# Patient Record
Sex: FEMALE | Race: AMERICAN INDIAN OR ALASKA NATIVE
[De-identification: names, ages, dates, MRNs, and addresses within clinical notes are randomized per-mention and may not be internally consistent; named-entity substitution may affect disease eponyms.]

---

## 2017-11-22 ENCOUNTER — HOSPITAL ENCOUNTER (EMERGENCY)
Dept: HOSPITAL 5 - ED | Age: 52
LOS: 2 days | Discharge: PSYCHIATRIC OR SUBSTANCE USE FACILITY | End: 2017-11-24
Payer: SELF-PAY
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
## 2017-11-22 DIAGNOSIS — F12.10: ICD-10-CM

## 2017-11-22 DIAGNOSIS — Z91.040: ICD-10-CM

## 2017-11-22 DIAGNOSIS — T42.4X2A: Primary | ICD-10-CM

## 2017-11-22 DIAGNOSIS — F17.200: ICD-10-CM

## 2017-11-22 DIAGNOSIS — F32.9: ICD-10-CM

## 2017-11-22 LAB
ALBUMIN SERPL-MCNC: 3.8 G/DL (ref 3.9–5)
ALBUMIN/GLOB SERPL: 1.2 %
ALP SERPL-CCNC: 115 UNITS/L (ref 35–129)
ALT SERPL-CCNC: 11 UNITS/L (ref 7–56)
ANION GAP SERPL CALC-SCNC: 19 MMOL/L
BACTERIA #/AREA URNS HPF: (no result) /HPF
BASOPHILS NFR BLD AUTO: 0.4 % (ref 0–1.8)
BILIRUB DIRECT SERPL-MCNC: < 0.2 MG/DL (ref 0–0.2)
BILIRUB SERPL-MCNC: 0.4 MG/DL (ref 0.1–1.2)
BILIRUB UR QL STRIP: (no result)
BLOOD UR QL VISUAL: (no result)
BUN SERPL-MCNC: 20 MG/DL (ref 7–17)
BUN/CREAT SERPL: 25 %
CALCIUM SERPL-MCNC: 9.1 MG/DL (ref 8.4–10.2)
CHLORIDE SERPL-SCNC: 101.4 MMOL/L (ref 98–107)
CO2 SERPL-SCNC: 28 MMOL/L (ref 22–30)
EOSINOPHIL NFR BLD AUTO: 2.1 % (ref 0–4.3)
GLUCOSE SERPL-MCNC: 131 MG/DL (ref 65–100)
HCT VFR BLD CALC: 41.5 % (ref 30.3–42.9)
HGB BLD-MCNC: 14 GM/DL (ref 10.1–14.3)
KETONES UR STRIP-MCNC: (no result) MG/DL
LEUKOCYTE ESTERASE UR QL STRIP: (no result)
MAGNESIUM SERPL-MCNC: 2 MG/DL (ref 1.7–2.3)
MCH RBC QN AUTO: 29 PG (ref 28–32)
MCHC RBC AUTO-ENTMCNC: 34 % (ref 30–34)
MCV RBC AUTO: 86 FL (ref 79–97)
MUCOUS THREADS #/AREA URNS HPF: (no result) /HPF
NITRITE UR QL STRIP: (no result)
PH UR STRIP: 5 [PH] (ref 5–7)
PLATELET # BLD: 262 K/MM3 (ref 140–440)
POTASSIUM SERPL-SCNC: 3.3 MMOL/L (ref 3.6–5)
PROT SERPL-MCNC: 7 G/DL (ref 6.3–8.2)
PROT UR STRIP-MCNC: (no result) MG/DL
RBC # BLD AUTO: 4.81 M/MM3 (ref 3.65–5.03)
RBC #/AREA URNS HPF: 1 /HPF (ref 0–6)
SODIUM SERPL-SCNC: 145 MMOL/L (ref 137–145)
URINE DRUGS OF ABUSE NOTE: (no result)
UROBILINOGEN UR-MCNC: < 2 MG/DL (ref ?–2)
WBC # BLD AUTO: 11 K/MM3 (ref 4.5–11)
WBC #/AREA URNS HPF: 1 /HPF (ref 0–6)

## 2017-11-22 PROCEDURE — 80320 DRUG SCREEN QUANTALCOHOLS: CPT

## 2017-11-22 PROCEDURE — 80307 DRUG TEST PRSMV CHEM ANLYZR: CPT

## 2017-11-22 PROCEDURE — 80074 ACUTE HEPATITIS PANEL: CPT

## 2017-11-22 PROCEDURE — 36415 COLL VENOUS BLD VENIPUNCTURE: CPT

## 2017-11-22 PROCEDURE — 99284 EMERGENCY DEPT VISIT MOD MDM: CPT

## 2017-11-22 PROCEDURE — G0480 DRUG TEST DEF 1-7 CLASSES: HCPCS

## 2017-11-22 PROCEDURE — 93005 ELECTROCARDIOGRAM TRACING: CPT

## 2017-11-22 PROCEDURE — 80048 BASIC METABOLIC PNL TOTAL CA: CPT

## 2017-11-22 PROCEDURE — 85025 COMPLETE CBC W/AUTO DIFF WBC: CPT

## 2017-11-22 PROCEDURE — 81001 URINALYSIS AUTO W/SCOPE: CPT

## 2017-11-22 PROCEDURE — 83735 ASSAY OF MAGNESIUM: CPT

## 2017-11-22 PROCEDURE — 93010 ELECTROCARDIOGRAM REPORT: CPT
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
## 2017-11-23 RX ADMIN — OXYBUTYNIN CHLORIDE SCH MG: 5 TABLET ORAL at 16:29

## 2017-11-23 RX ADMIN — AMLODIPINE BESYLATE SCH MG: 10 TABLET ORAL at 14:30

## 2017-11-23 RX ADMIN — SERTRALINE SCH MG: 50 TABLET, FILM COATED ORAL at 14:18

## 2017-11-23 NOTE — EVENT NOTE
Date: 11/23/17





Patient complained of mild throbbing headache.  The headache started last 

night.  Not sudden or thunderclap in nature.  It does not reach maximal 

intensity within an hour.  It is not the worse headache of her life.  She also 

reports her blood pressure medications which she has supplied to the nurse.  

When I walk into the room, the patient is speaking on the phone, in no distress

, and has an unremarkable neurologic examination.  There is specifically no 

neck pain or neck stiffness, and she walks with a steady gait with no obvious 

cranial nerve deficits, no motor deficits.


 Vital Signs











  11/22/17 11/22/17 11/22/17





  01:34 01:36 01:46


 


Temperature 97.4 F L  


 


Pulse Rate 95 H  90


 


Respiratory 16  25 H





Rate   


 


Blood Pressure 137/85 137/85 137/85


 


Blood Pressure   





[Right]   


 


O2 Sat by Pulse 98  98





Oximetry   














  11/22/17 11/22/17 11/22/17





  02:00 02:14 02:16


 


Temperature  98.2 F 


 


Pulse Rate 87  85


 


Respiratory 23 16 28 H





Rate   


 


Blood Pressure   136/81


 


Blood Pressure   





[Right]   


 


O2 Sat by Pulse 99 98 90





Oximetry   














  11/22/17 11/22/17 11/22/17





  02:30 02:46 03:16


 


Temperature   


 


Pulse Rate 86 87 91 H


 


Respiratory 29 H 30 H 28 H





Rate   


 


Blood Pressure 140/83 146/119 112/63


 


Blood Pressure   





[Right]   


 


O2 Sat by Pulse 97 96 95





Oximetry   














  11/22/17 11/22/17 11/22/17





  03:30 03:46 04:00


 


Temperature   


 


Pulse Rate 90 91 H 91 H


 


Respiratory 26 H 26 H 27 H





Rate   


 


Blood Pressure 112/63 115/67 115/67


 


Blood Pressure   





[Right]   


 


O2 Sat by Pulse 96 95 96





Oximetry   














  11/22/17 11/22/17 11/22/17





  04:16 04:30 04:46


 


Temperature   


 


Pulse Rate 92 H 89 85


 


Respiratory 28 H 25 H 26 H





Rate   


 


Blood Pressure 121/59 121/59 122/74


 


Blood Pressure   





[Right]   


 


O2 Sat by Pulse 94 95 97





Oximetry   














  11/22/17 11/22/17 11/22/17





  05:00 05:16 05:30


 


Temperature   


 


Pulse Rate 84 84 84


 


Respiratory 22 25 H 20





Rate   


 


Blood Pressure 122/74 121/66 


 


Blood Pressure   





[Right]   


 


O2 Sat by Pulse 95 95 95





Oximetry   














  11/22/17 11/22/17 11/22/17





  05:46 06:00 06:16


 


Temperature   


 


Pulse Rate 82 82 87


 


Respiratory 30 H 27 H 15





Rate   


 


Blood Pressure 121/66 121/66 


 


Blood Pressure   





[Right]   


 


O2 Sat by Pulse 97 94 





Oximetry   














  11/22/17 11/22/17 11/23/17





  14:00 16:43 02:00


 


Temperature 97.8 F  98.8 F


 


Pulse Rate 18 L  98 H


 


Respiratory 18 18 18





Rate   


 


Blood Pressure   


 


Blood Pressure 149/103  141/88





[Right]   


 


O2 Sat by Pulse  98 





Oximetry   














  11/23/17 11/23/17





  09:10 09:11


 


Temperature 98.2 F 


 


Pulse Rate 88 


 


Respiratory 20 20





Rate  


 


Blood Pressure  


 


Blood Pressure 171/100 





[Right]  


 


O2 Sat by Pulse 98 98





Oximetry  








 Lab Results











  11/22/17 11/22/17 11/22/17 Range/Units





  01:47 01:47 01:47 


 


WBC    11.0  (4.5-11.0)  K/mm3


 


RBC    4.81  (3.65-5.03)  M/mm3


 


Hgb    14.0  (10.1-14.3)  gm/dl


 


Hct    41.5  (30.3-42.9)  %


 


MCV    86  (79-97)  fl


 


MCH    29  (28-32)  pg


 


MCHC    34  (30-34)  %


 


RDW    15.2  (13.2-15.2)  %


 


Plt Count    262  (140-440)  K/mm3


 


Lymph % (Auto)    29.1  (13.4-35.0)  %


 


Mono % (Auto)    7.9 H  (0.0-7.3)  %


 


Eos % (Auto)    2.1  (0.0-4.3)  %


 


Baso % (Auto)    0.4  (0.0-1.8)  %


 


Lymph #    3.2  (1.2-5.4)  K/mm3


 


Mono #    0.9 H  (0.0-0.8)  K/mm3


 


Eos #    0.2  (0.0-0.4)  K/mm3


 


Baso #    0.0  (0.0-0.1)  K/mm3


 


Seg Neutrophils %    60.5  (40.0-70.0)  %


 


Seg Neutrophils #    6.7  (1.8-7.7)  K/mm3


 


Sodium  145    (137-145)  mmol/L


 


Potassium  3.3 L    (3.6-5.0)  mmol/L


 


Chloride  101.4    ()  mmol/L


 


Carbon Dioxide  28    (22-30)  mmol/L


 


Anion Gap  19    mmol/L


 


BUN  20 H    (7-17)  mg/dL


 


Creatinine  0.8    (0.7-1.2)  mg/dL


 


Estimated GFR  > 60    ml/min


 


BUN/Creatinine Ratio  25    %


 


Glucose  131 H    ()  mg/dL


 


Calcium  9.1    (8.4-10.2)  mg/dL


 


Magnesium     (1.7-2.3)  mg/dL


 


Total Bilirubin     (0.1-1.2)  mg/dL


 


Direct Bilirubin     (0-0.2)  mg/dL


 


AST     (5-40)  units/L


 


ALT     (7-56)  units/L


 


Alkaline Phosphatase     ()  units/L


 


Total Protein     (6.3-8.2)  g/dL


 


Albumin     (3.9-5)  g/dL


 


Albumin/Globulin Ratio     %


 


Urine Color     (Yellow)  


 


Urine Turbidity     (Clear)  


 


Urine pH     (5.0-7.0)  


 


Ur Specific Gravity     (1.003-1.030)  


 


Urine Protein     (Negative)  mg/dL


 


Urine Glucose (UA)     (Negative)  mg/dL


 


Urine Ketones     (Negative)  mg/dL


 


Urine Blood     (Negative)  


 


Urine Nitrite     (Negative)  


 


Ur Reducing Substances     


 


Urine Bilirubin     (Negative)  


 


Urine Ictotest     


 


Urine Urobilinogen     (<2.0)  mg/dL


 


Ur Leukocyte Esterase     (Negative)  


 


Urine WBC (Auto)     (0.0-6.0)  /HPF


 


Urine RBC (Auto)     (0.0-6.0)  /HPF


 


U Epithel Cells (Auto)     (0-13.0)  /HPF


 


Urine Bacteria (Auto)     (Negative)  /HPF


 


Hyaline Casts     /LPF


 


Urine Mucus     /HPF


 


Salicylates     (2.8-20.0)  mg/dL


 


Urine Opiates Screen     


 


Urine Methadone Screen     


 


Acetaminophen     (10.0-30.0)  ug/mL


 


Ur Barbiturates Screen     


 


Ur Phencyclidine Scrn     


 


Ur Amphetamines Screen     


 


U Benzodiazepines Scrn     


 


Urine Cocaine Screen     


 


U Marijuana (THC) Screen     


 


Drugs of Abuse Note     


 


Plasma/Serum Alcohol   < 0.01   (0-0.07)  gm%














  11/22/17 11/22/17 11/22/17 Range/Units





  01:47 01:47 01:47 


 


WBC     (4.5-11.0)  K/mm3


 


RBC     (3.65-5.03)  M/mm3


 


Hgb     (10.1-14.3)  gm/dl


 


Hct     (30.3-42.9)  %


 


MCV     (79-97)  fl


 


MCH     (28-32)  pg


 


MCHC     (30-34)  %


 


RDW     (13.2-15.2)  %


 


Plt Count     (140-440)  K/mm3


 


Lymph % (Auto)     (13.4-35.0)  %


 


Mono % (Auto)     (0.0-7.3)  %


 


Eos % (Auto)     (0.0-4.3)  %


 


Baso % (Auto)     (0.0-1.8)  %


 


Lymph #     (1.2-5.4)  K/mm3


 


Mono #     (0.0-0.8)  K/mm3


 


Eos #     (0.0-0.4)  K/mm3


 


Baso #     (0.0-0.1)  K/mm3


 


Seg Neutrophils %     (40.0-70.0)  %


 


Seg Neutrophils #     (1.8-7.7)  K/mm3


 


Sodium     (137-145)  mmol/L


 


Potassium     (3.6-5.0)  mmol/L


 


Chloride     ()  mmol/L


 


Carbon Dioxide     (22-30)  mmol/L


 


Anion Gap     mmol/L


 


BUN     (7-17)  mg/dL


 


Creatinine     (0.7-1.2)  mg/dL


 


Estimated GFR     ml/min


 


BUN/Creatinine Ratio     %


 


Glucose     ()  mg/dL


 


Calcium     (8.4-10.2)  mg/dL


 


Magnesium  2.00    (1.7-2.3)  mg/dL


 


Total Bilirubin  0.40    (0.1-1.2)  mg/dL


 


Direct Bilirubin  < 0.2    (0-0.2)  mg/dL


 


AST  11    (5-40)  units/L


 


ALT  11    (7-56)  units/L


 


Alkaline Phosphatase  115    ()  units/L


 


Total Protein  7.0    (6.3-8.2)  g/dL


 


Albumin  3.8 L    (3.9-5)  g/dL


 


Albumin/Globulin Ratio  1.2    %


 


Urine Color     (Yellow)  


 


Urine Turbidity     (Clear)  


 


Urine pH     (5.0-7.0)  


 


Ur Specific Gravity     (1.003-1.030)  


 


Urine Protein     (Negative)  mg/dL


 


Urine Glucose (UA)     (Negative)  mg/dL


 


Urine Ketones     (Negative)  mg/dL


 


Urine Blood     (Negative)  


 


Urine Nitrite     (Negative)  


 


Ur Reducing Substances     


 


Urine Bilirubin     (Negative)  


 


Urine Ictotest     


 


Urine Urobilinogen     (<2.0)  mg/dL


 


Ur Leukocyte Esterase     (Negative)  


 


Urine WBC (Auto)     (0.0-6.0)  /HPF


 


Urine RBC (Auto)     (0.0-6.0)  /HPF


 


U Epithel Cells (Auto)     (0-13.0)  /HPF


 


Urine Bacteria (Auto)     (Negative)  /HPF


 


Hyaline Casts     /LPF


 


Urine Mucus     /HPF


 


Salicylates   < 0.3 L   (2.8-20.0)  mg/dL


 


Urine Opiates Screen     


 


Urine Methadone Screen     


 


Acetaminophen    < 15.0  (10.0-30.0)  ug/mL


 


Ur Barbiturates Screen     


 


Ur Phencyclidine Scrn     


 


Ur Amphetamines Screen     


 


U Benzodiazepines Scrn     


 


Urine Cocaine Screen     


 


U Marijuana (THC) Screen     


 


Drugs of Abuse Note     


 


Plasma/Serum Alcohol     (0-0.07)  gm%














  11/22/17 11/22/17 Range/Units





  07:50 07:50 


 


WBC    (4.5-11.0)  K/mm3


 


RBC    (3.65-5.03)  M/mm3


 


Hgb    (10.1-14.3)  gm/dl


 


Hct    (30.3-42.9)  %


 


MCV    (79-97)  fl


 


MCH    (28-32)  pg


 


MCHC    (30-34)  %


 


RDW    (13.2-15.2)  %


 


Plt Count    (140-440)  K/mm3


 


Lymph % (Auto)    (13.4-35.0)  %


 


Mono % (Auto)    (0.0-7.3)  %


 


Eos % (Auto)    (0.0-4.3)  %


 


Baso % (Auto)    (0.0-1.8)  %


 


Lymph #    (1.2-5.4)  K/mm3


 


Mono #    (0.0-0.8)  K/mm3


 


Eos #    (0.0-0.4)  K/mm3


 


Baso #    (0.0-0.1)  K/mm3


 


Seg Neutrophils %    (40.0-70.0)  %


 


Seg Neutrophils #    (1.8-7.7)  K/mm3


 


Sodium    (137-145)  mmol/L


 


Potassium    (3.6-5.0)  mmol/L


 


Chloride    ()  mmol/L


 


Carbon Dioxide    (22-30)  mmol/L


 


Anion Gap    mmol/L


 


BUN    (7-17)  mg/dL


 


Creatinine    (0.7-1.2)  mg/dL


 


Estimated GFR    ml/min


 


BUN/Creatinine Ratio    %


 


Glucose    ()  mg/dL


 


Calcium    (8.4-10.2)  mg/dL


 


Magnesium    (1.7-2.3)  mg/dL


 


Total Bilirubin    (0.1-1.2)  mg/dL


 


Direct Bilirubin    (0-0.2)  mg/dL


 


AST    (5-40)  units/L


 


ALT    (7-56)  units/L


 


Alkaline Phosphatase    ()  units/L


 


Total Protein    (6.3-8.2)  g/dL


 


Albumin    (3.9-5)  g/dL


 


Albumin/Globulin Ratio    %


 


Urine Color  Yellow   (Yellow)  


 


Urine Turbidity  Clear   (Clear)  


 


Urine pH  5.0   (5.0-7.0)  


 


Ur Specific Gravity  1.016   (1.003-1.030)  


 


Urine Protein  <15 mg/dl   (Negative)  mg/dL


 


Urine Glucose (UA)  Neg   (Negative)  mg/dL


 


Urine Ketones  Neg   (Negative)  mg/dL


 


Urine Blood  Neg   (Negative)  


 


Urine Nitrite  Neg   (Negative)  


 


Ur Reducing Substances  Not Reportable   


 


Urine Bilirubin  Neg   (Negative)  


 


Urine Ictotest  Not Reportable   


 


Urine Urobilinogen  < 2.0   (<2.0)  mg/dL


 


Ur Leukocyte Esterase  Neg   (Negative)  


 


Urine WBC (Auto)  1.0   (0.0-6.0)  /HPF


 


Urine RBC (Auto)  1.0   (0.0-6.0)  /HPF


 


U Epithel Cells (Auto)  1.0   (0-13.0)  /HPF


 


Urine Bacteria (Auto)  1+   (Negative)  /HPF


 


Hyaline Casts  4   /LPF


 


Urine Mucus  Few   /HPF


 


Salicylates    (2.8-20.0)  mg/dL


 


Urine Opiates Screen   Presumptive negative  


 


Urine Methadone Screen   Presumptive negative  


 


Acetaminophen    (10.0-30.0)  ug/mL


 


Ur Barbiturates Screen   Presumptive negative  


 


Ur Phencyclidine Scrn   Presumptive negative  


 


Ur Amphetamines Screen   Presumptive negative  


 


U Benzodiazepines Scrn   Presumptive positive  


 


Urine Cocaine Screen   Presumptive negative  


 


U Marijuana (THC) Screen   Presumptive positive  


 


Drugs of Abuse Note   Disclamer  


 


Plasma/Serum Alcohol    (0-0.07)  gm%

## 2017-11-23 NOTE — CONSULTATION
History of Present Illness





- Reason for Consult


Consult date: 11/23/17


Reason for consult: Mental Health Evaluation


Requesting physician: REMINGTON SINGH





- Chief Complaint


Chief complaint: 


"I don't remember"








- History of Present Psychiatric Illness


52 y.o. AA female presenting to [facility] for possible overdose. Per the ER note, 

patient was pulled over by the local police for driving intoxicated. Today 

patient is calm and cooperative during the assessment. She stated that she took 

her medications yesterday morning along with taking a muscle relaxant midday. 

She stated that she takes evening medications also. She stated that she took a 

xanax and drove to the store and don't remember anything else. She stated that 

she woke up in the hospital. She stated that she takes a lot of medications and 

would like to decrease some of her pills. She stated having a lot of medical 

issues that has interfered with her being able to work. She stated that she 

feel sad and hopeless because she depends on her kids and  for "money 

and other assistance." She stated that she was dx with depression and anxiety a 

couple years ago. She rate her depression 6/10, with 10 being the worse. She 

denies trying to harm herself prior to her admission. She denies SI/HI's and AVH

's. She denies manic episodes. She admitted to smoking marijuana, but denies 

alcohol consumption (etoh). She stated that she take Zoloft for depression.








Medications and Allergies


 Allergies











Allergy/AdvReac Type Severity Reaction Status Date / Time


 


latex Allergy  Rash Verified 11/23/17 12:52











 Home Medications











 Medication  Instructions  Recorded  Confirmed  Last Taken  Type


 


Cyclobenzaprine [Flexeril] 10 mg PO DAILY 11/23/17 11/23/17 Unknown History


 


Ibuprofen [Motrin] 800 mg PO Q8HR PRN 11/23/17 11/23/17 Unknown History


 


Oxybutynin [Ditropan] 5 mg PO DAILY 11/23/17 11/23/17 Unknown History


 


Sertraline HCl [Zoloft] 50 mg PO DAILY 11/23/17 11/23/17 Unknown History


 


Valsartan [Diovan] 320 mg PO QDAY 11/23/17 11/23/17 Unknown History


 


amLODIPine [Norvasc] 10 mg PO DAILY 11/23/17 11/23/17 Unknown History











Active Meds: 


Active Medications





Acetaminophen (Tylenol)  650 mg PO Q4HR PRN


   PRN Reason: Pain MILD(1-3)/Fever >100.5/HA


Al Hydrox/Mg Hydrox/Simethicone (Alum-Mag Hydrox-Simeth [id number]/5ml)  30 

ml PO Q4HR PRN


   PRN Reason: Indigestion


Magnesium Hydroxide (Milk Of Magnesia)  30 ml PO Q12HR PRN


   PRN Reason: Constipation











Past psychiatric history





- Past Medical History


Past Medical History: hypertension


Past Surgical History: Other (Hernia Repair)





- past Psychiatric treatment and history


Psych: Depression





- Social History


Social history: lives with family





Mental Status Exam





- Vital signs


 Last Vital Signs











Temp  98.2 F   11/23/17 09:10


 


Pulse  88   11/23/17 09:10


 


Resp  20   11/23/17 09:11


 


BP  171/100   11/23/17 09:10


 


Pulse Ox  98   11/23/17 09:11














- Exam


Narrative exam: 


MSE:





 Appearance: calm, cooperative


 Behavior: regular eye contact


 Speech: regular rate and tone


 Mood: "so so"   


 Affect: congruent to mood


 Thought Process: circumstantial


 Thought Content: denies SI/HI's and AVH's


 Motor Activity: ambulatory


 Cognition: A/O x3


 Insight: variable


 Judgment: variable








Results


Result Diagrams: 


 11/22/17 01:47





 11/22/17 01:47


All other labs normal.








Assessment and Plan


Assessment and plan: 


Impression: MDD. DILSHAD. Substance Use DO. (marijuana). Today patient is calm and 

cooperative during the assessment. Possible polypharmacy with this patient. 

Positive for marijuana and benzos.





DDx: R/O Bipolar DO





Recommendation/Plan: Continue 1013 and gather collateral information to 

determine proper dispo. Start Zoloft 50 mg PO daily for depression and Xanax 

0.5 mg PO Q8hrs PRN for anxiety. Discussed possible suicidality/medication 

induced padmini with patient reference Zoloft.

## 2017-11-24 VITALS — SYSTOLIC BLOOD PRESSURE: 173 MMHG | DIASTOLIC BLOOD PRESSURE: 108 MMHG

## 2017-11-24 RX ADMIN — AMLODIPINE BESYLATE SCH MG: 10 TABLET ORAL at 10:41

## 2017-11-24 RX ADMIN — OXYBUTYNIN CHLORIDE SCH MG: 5 TABLET ORAL at 10:41

## 2017-11-24 RX ADMIN — SERTRALINE SCH MG: 50 TABLET, FILM COATED ORAL at 10:41

## 2017-11-24 NOTE — PROGRESS NOTE
Subjective





- Reason for Consult


Consult date: 11/24/17


Reason for consult: Psychiatry Follow-up





- Chief Complaint


Chief complaint: 


"Hello"





52 y.o. AA female presenting to [facility] for possible overdose. Per the ER note, 

patient was pulled over by the local police for driving intoxicated. Today 

patient is calm and cooperative during the assessment. She stated that she 

should have not drove after taking the Xanax a couple days ago prior to her 

admission to [facility]. She plan to see her PCP to minimize some of her medications. 

She denies SI/HI's and AVH's. She denies any side side effects of her 

medications.








Mental Status Exam





- Vital signs


 Last Vital Signs











Temp  98.4 F   11/23/17 19:21


 


Pulse  96 H  11/23/17 19:21


 


Resp  16   11/23/17 19:21


 


BP  148/96   11/23/17 19:21


 


Pulse Ox  98   11/23/17 19:21














- Exam


Narrative exam: 


MSE:





 Appearance: calm, cooperative


 Behavior: regular eye contact


 Speech: regular rate and tone


 Mood: "better"   


 Affect: congruent to mood


 Thought Process: linear


 Thought Content: denies SI/HI's and AVH's


 Motor Activity: ambulatory


 Cognition: A/O x3


 Insight: fair


 Judgment: fair








Assessment and Plan


Impression: MDD. DILSHAD. Substance Use DO. (marijuana). Today patient is calm and 

cooperative during the assessment. Possible polypharmacy with this patient. 

Positive for marijuana and benzos.





DDx: R/O Bipolar DO





Recommendation/Plan: Rescind 1013. Continue Zoloft 50 mg PO daily for 

depression. Discussed possible suicidality/medication induced padmini with 

patient reference Zoloft. Patient's PCP manage all her medications. Patient 

given outpatient psy/rehab services for [facility].

## 2017-11-24 NOTE — EVENT NOTE
Date: 11/24/17





Patient has been evaluated by me and I will send patient home to the [facility].  Patient has no suicidal or homicidal ideation.  1013 for the patient 

has been rescinded.  Patient has clear goal-directed behavior and states that 

she is ready to be discharged.  Patient verbalized her discharge plan peds 

psychiatry had for her.

## 2018-01-17 ENCOUNTER — HOSPITAL ENCOUNTER (EMERGENCY)
Dept: HOSPITAL 5 - ED | Age: 53
Discharge: HOME | End: 2018-01-17
Payer: MEDICARE

## 2018-01-17 VITALS — SYSTOLIC BLOOD PRESSURE: 158 MMHG | DIASTOLIC BLOOD PRESSURE: 68 MMHG

## 2018-01-17 DIAGNOSIS — Z91.040: ICD-10-CM

## 2018-01-17 DIAGNOSIS — M19.90: ICD-10-CM

## 2018-01-17 DIAGNOSIS — F17.200: ICD-10-CM
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
## 2018-01-17 DIAGNOSIS — S39.012A: Primary | ICD-10-CM

## 2018-01-17 DIAGNOSIS — I10: ICD-10-CM
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
## 2018-01-17 LAB
BACTERIA #/AREA URNS HPF: (no result) /HPF
BILIRUB UR QL STRIP: (no result)
BLOOD UR QL VISUAL: (no result)
MUCOUS THREADS #/AREA URNS HPF: (no result) /HPF
NITRITE UR QL STRIP: (no result)
PH UR STRIP: 6 [PH] (ref 5–7)
PROT UR STRIP-MCNC: (no result) MG/DL
RBC #/AREA URNS HPF: < 1 /HPF (ref 0–6)
UROBILINOGEN UR-MCNC: < 2 MG/DL (ref ?–2)
WBC #/AREA URNS HPF: < 1 /HPF (ref 0–6)

## 2018-01-17 PROCEDURE — 96372 THER/PROPH/DIAG INJ SC/IM: CPT

## 2018-01-17 PROCEDURE — 81001 URINALYSIS AUTO W/SCOPE: CPT

## 2018-01-17 PROCEDURE — 81025 URINE PREGNANCY TEST: CPT

## 2018-01-17 PROCEDURE — 99283 EMERGENCY DEPT VISIT LOW MDM: CPT

## 2018-01-17 NOTE — EMERGENCY DEPARTMENT REPORT
ED Back Pain/Injury HPI





- General


Chief Complaint: Back Pain/Injury


Stated Complaint: BACK PAIN


Time Seen by Provider: 18 07:35


Source: patient


Limitations: No Limitations





- History of Present Illness


Initial Comments: 


pt is a 51 y/o aaf with hx of htn who presents for right side low back pain x 1 

day pt denies fall injury or trauma there is no fever or chills, no dysuria 

frequency or urgency no hematuria or hesitancy, there is no numbness no 

tingling no paresthesia , no decrease in bowel or bladder function  pain is 

exacerbated by movement bending and twisting pain is relieved by rest, pt has 

not attempt otc nsaids. 





MD Complaint: back pain


Onset/Timin


-: days(s)


Similar Symptoms Previously: Yes


Place: home


Radiation: right leg


Severity: moderate


Severity scale (0 -10): 4


Quality: aching


Consistency: constant


Improves With: none


Worsens With: movement, walking


Associated Symptoms: denies: confusion, weakness, chest pain, numbness, 

difficulty walking, cough, difficulty urinating, diaphoresis, incontinence, 

fever/chills, constipation, headaches, abdominal pain, loss of appetite, malaise

, nausea/vomiting, rash, seizure, shortness of breath, syncope





- Related Data


 Home Medications











 Medication  Instructions  Recorded  Confirmed  Last Taken


 


Cyclobenzaprine [Flexeril] 10 mg PO DAILY 17 Unknown


 


Ibuprofen [Motrin] 800 mg PO Q8HR PRN 17 Unknown


 


Oxybutynin [Ditropan] 5 mg PO DAILY 17 Unknown


 


Sertraline HCl [Zoloft] 50 mg PO DAILY 17 Unknown


 


Valsartan [Diovan] 320 mg PO QDAY 17 Unknown


 


amLODIPine [Norvasc] 10 mg PO DAILY 17 Unknown








 Previous Rx's











 Medication  Instructions  Recorded  Last Taken  Type


 


Cyclobenzaprine [Flexeril] 10 mg PO BID PRN #20 tablet 18 Unknown Rx


 


Menthol/Camphor [Tiger Balm 1 applicatio TP TID PRN #1 tube 18 Unknown Rx





Ointment]    


 


Naproxen [Naprosyn TAB] 500 mg PO BID PRN #30 tablet 18 Unknown Rx











 Allergies











Allergy/AdvReac Type Severity Reaction Status Date / Time


 


latex Allergy  Rash Verified 17 12:52














ED Review of Systems


ROS: 


Stated complaint: BACK PAIN


Other details as noted in HPI





Constitutional: denies: chills, fever


Eyes: denies: eye pain, eye discharge, vision change


ENT: denies: ear pain, throat pain


Respiratory: denies: cough, shortness of breath, wheezing


Cardiovascular: denies: chest pain, palpitations


Endocrine: no symptoms reported


Gastrointestinal: denies: abdominal pain, nausea, diarrhea


Genitourinary: denies: urgency, dysuria, discharge


Musculoskeletal: back pain, arthralgia.  denies: joint swelling, myalgia


Skin: denies: rash, lesions


Neurological: denies: headache, weakness, paresthesias


Psychiatric: denies: anxiety, depression


Hematological/Lymphatic: denies: easy bleeding, easy bruising





ED Past Medical Hx





- Past Medical History


Previous Medical History?: Yes


Hx Hypertension: Yes


Hx Arthritis: Yes


Hx Psychiatric Treatment: Yes (depression)


Additional medical history: hernias





- Surgical History


Past Surgical History?: Yes


Additional Surgical History: hernia repair





- Social History


Smoking Status: Current Every Day Smoker


Substance Use Type: None





- Medications


Home Medications: 


 Home Medications











 Medication  Instructions  Recorded  Confirmed  Last Taken  Type


 


Cyclobenzaprine [Flexeril] 10 mg PO DAILY 17 Unknown History


 


Ibuprofen [Motrin] 800 mg PO Q8HR PRN 17 Unknown History


 


Oxybutynin [Ditropan] 5 mg PO DAILY 17 Unknown History


 


Sertraline HCl [Zoloft] 50 mg PO DAILY 17 Unknown History


 


Valsartan [Diovan] 320 mg PO QDAY 17 Unknown History


 


amLODIPine [Norvasc] 10 mg PO DAILY 17 Unknown History


 


Cyclobenzaprine [Flexeril] 10 mg PO BID PRN #20 tablet 18  Unknown Rx


 


Menthol/Camphor [Tiger Balm 1 applicatio TP TID PRN #1 tube 18  Unknown Rx





Ointment]     


 


Naproxen [Naprosyn TAB] 500 mg PO BID PRN #30 tablet 18  Unknown Rx














ED Physical Exam





- General


Limitations: No Limitations


General appearance: alert, in no apparent distress





- Head


Head exam: Present: atraumatic, normocephalic





- Eye


Eye exam: Present: normal appearance





- ENT


ENT exam: Present: mucous membranes moist





- Neck


Neck exam: Present: normal inspection





- Respiratory


Respiratory exam: Present: normal lung sounds bilaterally.  Absent: respiratory 

distress





- Cardiovascular


Cardiovascular Exam: Present: regular rate, normal rhythm.  Absent: systolic 

murmur, diastolic murmur, rubs, gallop





- GI/Abdominal


GI/Abdominal exam: Present: soft, normal bowel sounds





- Rectal


Rectal exam: Present: deferred





- Extremities Exam


Extremities exam: Present: normal inspection, full ROM, normal capillary 

refill.  Absent: tenderness, pedal edema, joint swelling, calf tenderness





- Back Exam


Back exam: Present: normal inspection, full ROM (restricted by pain ), 

tenderness (right lumbar muscular tenderness ), muscle spasm, paraspinal 

tenderness.  Absent: CVA tenderness (R), CVA tenderness (L), vertebral 

tenderness, rash noted





- Neurological Exam


Neurological exam: Present: alert, oriented X3, CN II-XII intact, normal gait, 

reflexes normal.  Absent: motor sensory deficit





- Expanded Neurological Exam


  ** Expanded


Patient oriented to: Present: person, place, time


Speech: Present: fluid speech


Cranial nerves: EOM's Intact: Normal, Gag Reflex: Normal, Tongue Deviation: 

Normal, Nystagmus: Normal, Facial Sensation: Normal


Cerebellar function: Finger to Nose: Normal, Heel to Shin: Normal, Romberg: 

Normal


Upper motor neuron: Andrew Neglect: Normal, Pronator Drift: Normal, Babinski Sign

: Normal, Sensory Extinction: Normal


Sensory exam: Upper Extremity Light Touch: Normal, Upper Extremity Pin Prick: 

Normal, Upper Extremity Temperature: Normal, UE 2 Point Discrimination: Normal, 

Lower Extremity Light Touch: Normal, Lower Extremity Pin Prick: Normal, Lower 

Extremity Temperature: Normal, LE 2 Point Discrimination: Normal


Motor strength exam: RUE: 5, LUE: 5, RLE: 5, LLE: 5


DTR: knee (R): 2+, knee (L): 2+, ankle (R): 2+, ankle (L): 2+


Best Eye Response ([place]): (4) open spontaneously


Best Motor Response (Live): (6) obeys commands


Best Verbal Response (Live): (5) oriented


Live Total: 15





- Psychiatric


Psychiatric exam: Present: normal affect, normal mood





- Skin


Skin exam: Present: warm, dry, intact, normal color.  Absent: rash





ED Course





 Vital Signs











  18





  04:43 04:52 06:17


 


Temperature 97.8 F 97.8 F 98.9 F


 


Pulse Rate  90 86


 


Respiratory 18 18 18





Rate   


 


Blood Pressure 197/116 197/116 


 


Blood Pressure   158/68





[Right]   


 


O2 Sat by Pulse  197 H 100





Oximetry   














ED Medical Decision Making





- Medical Decision Making


tp presents for low back pain x 1 day denies injury fall or trauma no hx of 

renal stones or uti, denies dysuria or hematuria there is no numbness tingling 

paresthesia or weakness no loss or decrease in bowel or bladder function no 

fever or chills, initial pain 8/10 pain and spasm pain now 3/10 after ibuprofen 

and flexeril given in ed, pt now refuse ua as she has been here all night, " I 

wont wait any longer" plan: dc to self with rx for nsaids and muscle relaxant, 

mois heat therapy and back exercises pt will follow up with pcp Dr. Pedersen in 2-

3 days or return to ed if symptoms worsen, pt with not htn episode, has hx of 

htn, no valsartan, atenolol, and amlodipine but did not take bp medication today

, advised to take medication upon arrival to home, currently pt is a/ox 3 with 

nad no headache no dizziness no light headedness no cp no sob pt for d/c to 

self in stable condition at this time. 





Critical care attestation.: 


If time is entered above; I have spent that time in minutes in the direct care 

of this critically ill patient, excluding procedure time.








ED Disposition


Clinical Impression: 


Low back strain


Qualifiers:


 Encounter type: initial encounter Qualified Code(s): S39.012A - Strain of 

muscle, fascia and tendon of lower back, initial encounter





Disposition: DC-01 TO HOME OR SELFCARE


Is pt being admited?: No


Does the pt Need Aspirin: No


Condition: Good


Instructions:  Low Back Strain (ED), Core Strengthening Exercises (GEN)


Prescriptions: 


Cyclobenzaprine [Flexeril] 10 mg PO BID PRN #20 tablet


 PRN Reason: Muscle Spasm


Menthol/Camphor [Tiger Balm Ointment] 1 applicatio TP TID PRN #1 tube


 PRN Reason: Pain , Severe (7-10)


Naproxen [Naprosyn TAB] 500 mg PO BID PRN #30 tablet


 PRN Reason: Pain


Referrals: 


CAROLINA ERAZO MD [Primary Care Provider] - 3-5 Days


Forms:  Work/School Release Form(ED)


Time of Disposition: 08:13

## 2019-08-14 ENCOUNTER — HOSPITAL ENCOUNTER (EMERGENCY)
Dept: HOSPITAL 5 - ED | Age: 54
LOS: 1 days | Discharge: HOME | End: 2019-08-15
Payer: MEDICARE

## 2019-08-14 VITALS — SYSTOLIC BLOOD PRESSURE: 170 MMHG | DIASTOLIC BLOOD PRESSURE: 90 MMHG
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
## 2019-08-14 DIAGNOSIS — F17.200: ICD-10-CM

## 2019-08-14 DIAGNOSIS — S61.250A: Primary | ICD-10-CM

## 2019-08-14 DIAGNOSIS — M19.90: ICD-10-CM
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
## 2019-08-14 DIAGNOSIS — F32.9: ICD-10-CM

## 2019-08-14 DIAGNOSIS — I10: ICD-10-CM

## 2019-08-14 PROCEDURE — A6250 SKIN SEAL PROTECT MOISTURIZR: HCPCS

## 2019-08-14 PROCEDURE — 99282 EMERGENCY DEPT VISIT SF MDM: CPT

## 2019-08-15 NOTE — EMERGENCY DEPARTMENT REPORT
ED General Adult HPI





- General


Chief complaint: Wound/Laceration


Stated complaint: FINGER LAC


Time Seen by Provider: 08/15/19 00:05


Source: patient


Mode of arrival: Ambulatory


Limitations: No Limitations





- History of Present Illness


Initial comments: 





Patient is a 53-year-old American female with a history of hypertension and 

non-insulin-dependent diabetes presents to ED with complaint of acute onset 

persistent painful bleeding distal right index finger laceration after pet 

turtle bit her on the right index finger tip about 2 hours ago.  Patient states 

that the bleeding has not stopped since the bite occurred.  Patient denies 

numbness, tingling or weakness of right index finger right hand, syncope, 

shortness of breath, nausea, vomiting, fever, chills or dizziness.


MD Complaint: right index finger puncture wound; turtle bite


-: Sudden, hour(s) (2)


Location: right, upper extremity (right index finger tip)


Severity scale (0 -10): 7


Quality: aching, sharp, constant


Consistency: constant


Improves with: none


Worsens with: none


Associated Symptoms: denies other symptoms.  denies: confusion, chest pain, 

cough, diaphoresis, headaches, loss of appetite, malaise, nausea/vomiting, rash,

shortness of breath, syncope, weakness


Treatments Prior to Arrival: none





- Related Data


                                Home Medications











 Medication  Instructions  Recorded  Confirmed  Last Taken


 


Cyclobenzaprine [Flexeril] 10 mg PO DAILY 11/23/17 11/23/17 Unknown


 


Ibuprofen [Motrin] 800 mg PO Q8HR PRN 11/23/17 11/23/17 Unknown


 


Oxybutynin [Ditropan] 5 mg PO DAILY 11/23/17 11/23/17 Unknown


 


Sertraline HCl [Zoloft] 50 mg PO DAILY 11/23/17 11/23/17 Unknown


 


Valsartan [Diovan] 320 mg PO QDAY 11/23/17 11/23/17 Unknown


 


amLODIPine [Norvasc] 10 mg PO DAILY 11/23/17 11/23/17 Unknown








                                  Previous Rx's











 Medication  Instructions  Recorded  Last Taken  Type


 


Cyclobenzaprine [Flexeril] 10 mg PO BID PRN #20 tablet 01/17/18 Unknown Rx


 


Menthol/Camphor [Tiger Balm 1 applicatio TP TID PRN #1 tube 01/17/18 Unknown Rx





Ointment]    


 


Naproxen [Naprosyn TAB] 500 mg PO BID PRN #30 tablet 01/17/18 Unknown Rx


 


Ibuprofen [Motrin] 800 mg PO Q8HR PRN #20 tablet 08/15/19 Unknown Rx


 


Sulfamethoxazole/Trimethoprim 1 each PO Q12H #20 tablet 08/15/19 Unknown Rx





[Bactrim DS TAB]    











                                    Allergies











Allergy/AdvReac Type Severity Reaction Status Date / Time


 


latex Allergy  Rash Verified 11/23/17 12:52














ED Review of Systems


ROS: 


Stated complaint: FINGER LAC


Other details as noted in HPI





Constitutional: denies: chills, fever


Eyes: denies: eye pain, eye discharge, vision change


ENT: denies: ear pain, throat pain


Respiratory: denies: cough, shortness of breath, wheezing


Cardiovascular: denies: chest pain, palpitations


Endocrine: no symptoms reported


Gastrointestinal: denies: abdominal pain, nausea, diarrhea


Genitourinary: denies: urgency, dysuria, discharge


Musculoskeletal: arthralgia (distal right index finger pain due to a bleeding 

puncture wound from a turtle bite).  denies: back pain, joint swelling


Skin: other (Bleeding painful puncture wound from a turtle bite on distal right 

index finger tip).  denies: rash, lesions


Neurological: denies: headache, weakness, paresthesias


Psychiatric: denies: anxiety, depression


Hematological/Lymphatic: denies: easy bleeding, easy bruising





ED Past Medical Hx





- Past Medical History


Hx Hypertension: Yes


Hx Arthritis: Yes


Hx Psychiatric Treatment: Yes (depression)


Additional medical history: hernias





- Surgical History


Additional Surgical History: hernia repair





- Social History


Smoking Status: Current Every Day Smoker





- Medications


Home Medications: 


                                Home Medications











 Medication  Instructions  Recorded  Confirmed  Last Taken  Type


 


Cyclobenzaprine [Flexeril] 10 mg PO DAILY 11/23/17 11/23/17 Unknown History


 


Ibuprofen [Motrin] 800 mg PO Q8HR PRN 11/23/17 11/23/17 Unknown History


 


Oxybutynin [Ditropan] 5 mg PO DAILY 11/23/17 11/23/17 Unknown History


 


Sertraline HCl [Zoloft] 50 mg PO DAILY 11/23/17 11/23/17 Unknown History


 


Valsartan [Diovan] 320 mg PO QDAY 11/23/17 11/23/17 Unknown History


 


amLODIPine [Norvasc] 10 mg PO DAILY 11/23/17 11/23/17 Unknown History


 


Cyclobenzaprine [Flexeril] 10 mg PO BID PRN #20 tablet 01/17/18  Unknown Rx


 


Menthol/Camphor [Tiger Balm 1 applicatio TP TID PRN #1 tube 01/17/18  Unknown Rx





Ointment]     


 


Naproxen [Naprosyn TAB] 500 mg PO BID PRN #30 tablet 01/17/18  Unknown Rx


 


Ibuprofen [Motrin] 800 mg PO Q8HR PRN #20 tablet 08/15/19  Unknown Rx


 


Sulfamethoxazole/Trimethoprim 1 each PO Q12H #20 tablet 08/15/19  Unknown Rx





[Bactrim DS TAB]     














ED Physical Exam





- General


Limitations: No Limitations


General appearance: alert, in no apparent distress





- Head


Head exam: Present: atraumatic, normocephalic, normal inspection





- Eye


Eye exam: Present: normal appearance, PERRL, EOMI.  Absent: scleral icterus, 

nystagmus, periorbital swelling, periorbital tenderness


Pupils: Present: normal accommodation





- ENT


ENT exam: Present: normal exam, normal orophraynx, mucous membranes moist, TM's 

normal bilaterally, normal external ear exam





- Neck


Neck exam: Present: normal inspection, full ROM.  Absent: tenderness, 

meningismus, lymphadenopathy, thyromegaly





- Respiratory


Respiratory exam: Present: normal lung sounds bilaterally.  Absent: respiratory 

distress, wheezes, rales, rhonchi, chest wall tenderness, accessory muscle use, 

decreased breath sounds





- Cardiovascular


Cardiovascular Exam: Present: regular rate, normal rhythm, normal heart sounds. 

Absent: systolic murmur, diastolic murmur, rubs, gallop





- GI/Abdominal


GI/Abdominal exam: Present: soft, normal bowel sounds.  Absent: distended, 

tenderness, guarding, hyperactive bowel sounds, organomegaly, mass





- Rectal


Rectal exam: Present: deferred





- Extremities Exam


Extremities exam: Present: normal inspection, tenderness (Medical distal right 

index finger tenderness due to a bleeding 1 cm puncture wound from a turtle 

bite), normal capillary refill





- Back Exam


Back exam: Present: normal inspection, full ROM.  Absent: tenderness, CVA 

tenderness (R), CVA tenderness (L), muscle spasm, paraspinal tenderness, 

vertebral tenderness





- Neurological Exam


Neurological exam: Present: alert, oriented X3, CN II-XII intact, normal gait, 

reflexes normal





- Psychiatric


Psychiatric exam: Present: normal affect, normal mood





- Skin


Skin exam: Present: warm, dry, intact, normal color, other (bleeding painful 1 

cm puncture wound on the distal right index finger from a turtle bite).  Absent:

rash





ED Course


                                   Vital Signs











  08/14/19 08/14/19





  22:56 23:38


 


Temperature 98.4 F 98.4 F


 


Pulse Rate 110 H 110 H


 


Respiratory 20 18





Rate  


 


Blood Pressure  170/90


 


Blood Pressure 170/90 





[Left]  


 


O2 Sat by Pulse 96 96





Oximetry  














- Reevaluation(s)


Reevaluation #1: 





08/15/19 07:29


This is a 53-year-old, female who presented to the ED with bleeding painful 

distal right index finger puncture wound from a turtle bite.  In the ED, patient

 is alert and oriented 3 and is content in distress but tachycardic in triage. 

 Patient is up-to-date on Tetanus vaccinations having received the latest one 8 

months ago.  Patient's wound was cleaned thoroughly and bacitracin ointment 

applied to the wound and it was dressed appropriately.  Patient was started on 

oral Bactrim DS tablets in the ED and was also treated for pain.  On 

reevaluation, patient's pain is well controlled with medications, and was 

discharged home on prophylactic Bactrim DS twice a day for 10 days, and pain 

medications as needed.  Tachycardia also resolved after treatment.  Patient was 

advised to return to the ED immediately if symptoms get worse.  Otherwise 

patient was advised to follow-up with her primary care physician in 7-10 days 

for reevaluation.





ED Medical Decision Making





- Medical Decision Making





This is a 53-year-old, female who presented to the ED with bleeding painful 

distal right index finger puncture wound from a turtle bite.  In the ED, patient

 is alert and oriented 3 and is content in distress but tachycardic in triage. 

 Patient is up-to-date on Tetanus vaccinations having received the latest one 8 

months ago.  Patient's wound was cleaned thoroughly and bacitracin ointment 

applied to the wound and it was dressed appropriately.  Patient was started on 

oral Bactrim DS tablets in the ED and was also treated for pain.  On 

reevaluation, patient's pain is well controlled with medications, and was 

discharged home on prophylactic Bactrim DS twice a day for 10 days, and pain 

medications as needed.  Tachycardia also resolved after treatment.  Patient was 

advised to return to the ED immediately if symptoms get worse.  Otherwise 

patient was advised to follow-up with her primary care physician in 7-10 days 

for reevaluation.





- Differential Diagnosis


Puncture wound; Turtle bite of finger; risk of salmonella infection


Critical care attestation.: 


If time is entered above; I have spent that time in minutes in the direct care 

of this critically ill patient, excluding procedure time.








ED Disposition


Clinical Impression: 


 Puncture wound of right index finger





Turtle bite


Qualifiers:


 Encounter type: initial encounter Qualified Code(s): W59.21XA - Bitten by 

turtle, initial encounter





Disposition: DC-01 TO HOME OR SELFCARE


Is pt being admited?: No


Does the pt Need Aspirin: No


Condition: Stable


Instructions:  Animal Bite (ED), Puncture Wound (ED)


Additional Instructions: 


Take medications with food, drink plenty of fluids and follow-up with your 

primary care physician in 7-10 days for reevaluation.  Return to the ED 

immediately if symptoms get worse.


Prescriptions: 


Sulfamethoxazole/Trimethoprim [Bactrim DS TAB] 1 each PO Q12H #20 tablet


Ibuprofen [Motrin] 800 mg PO Q8HR PRN #20 tablet


 PRN Reason: Pain , Severe (7-10)


Referrals: 


RENZO VIRAMONTES MD [Primary Care Provider] - 3-5 Days


Time of Disposition: 01:29


Print Language: ENGLISH